# Patient Record
Sex: MALE | Race: ASIAN | NOT HISPANIC OR LATINO
[De-identification: names, ages, dates, MRNs, and addresses within clinical notes are randomized per-mention and may not be internally consistent; named-entity substitution may affect disease eponyms.]

---

## 2019-01-03 ENCOUNTER — RX RENEWAL (OUTPATIENT)
Age: 64
End: 2019-01-03

## 2020-02-06 ENCOUNTER — RX RENEWAL (OUTPATIENT)
Age: 65
End: 2020-02-06

## 2021-09-16 ENCOUNTER — APPOINTMENT (OUTPATIENT)
Age: 66
End: 2021-09-16
Payer: COMMERCIAL

## 2021-09-16 ENCOUNTER — NON-APPOINTMENT (OUTPATIENT)
Age: 66
End: 2021-09-16

## 2021-09-16 ENCOUNTER — TRANSCRIPTION ENCOUNTER (OUTPATIENT)
Age: 66
End: 2021-09-16

## 2021-09-16 ENCOUNTER — LABORATORY RESULT (OUTPATIENT)
Age: 66
End: 2021-09-16

## 2021-09-16 VITALS
BODY MASS INDEX: 26.98 KG/M2 | WEIGHT: 178 LBS | SYSTOLIC BLOOD PRESSURE: 160 MMHG | TEMPERATURE: 97.6 F | DIASTOLIC BLOOD PRESSURE: 80 MMHG | OXYGEN SATURATION: 99 % | HEART RATE: 98 BPM | HEIGHT: 68 IN

## 2021-09-16 DIAGNOSIS — I10 ESSENTIAL (PRIMARY) HYPERTENSION: ICD-10-CM

## 2021-09-16 DIAGNOSIS — Z82.0 FAMILY HISTORY OF EPILEPSY AND OTHER DISEASES OF THE NERVOUS SYSTEM: ICD-10-CM

## 2021-09-16 DIAGNOSIS — Z82.61 FAMILY HISTORY OF ARTHRITIS: ICD-10-CM

## 2021-09-16 DIAGNOSIS — Z00.00 ENCOUNTER FOR GENERAL ADULT MEDICAL EXAMINATION W/OUT ABNORMAL FINDINGS: ICD-10-CM

## 2021-09-16 DIAGNOSIS — Z80.3 FAMILY HISTORY OF MALIGNANT NEOPLASM OF BREAST: ICD-10-CM

## 2021-09-16 PROCEDURE — 99203 OFFICE O/P NEW LOW 30 MIN: CPT | Mod: 25

## 2021-09-16 PROCEDURE — 36415 COLL VENOUS BLD VENIPUNCTURE: CPT

## 2021-09-16 RX ORDER — LISINOPRIL 20 MG/1
20 TABLET ORAL
Qty: 30 | Refills: 2 | Status: DISCONTINUED | COMMUNITY
Start: 2019-01-03 | End: 2021-09-16

## 2021-09-16 RX ORDER — GLIPIZIDE 5 MG/1
5 TABLET, FILM COATED, EXTENDED RELEASE ORAL
Refills: 0 | Status: ACTIVE | COMMUNITY

## 2021-09-16 RX ORDER — GLIPIZIDE 10 MG/1
10 TABLET ORAL
Refills: 0 | Status: ACTIVE | COMMUNITY

## 2021-09-16 RX ORDER — AMLODIPINE BESYLATE 5 MG/1
5 TABLET ORAL
Refills: 0 | Status: ACTIVE | COMMUNITY

## 2021-09-16 RX ORDER — METOCLOPRAMIDE 5 MG/1
5 TABLET ORAL 4 TIMES DAILY
Qty: 120 | Refills: 0 | Status: ACTIVE | COMMUNITY
Start: 2021-09-16 | End: 1900-01-01

## 2021-09-16 RX ORDER — LISINOPRIL 5 MG/1
5 TABLET ORAL
Refills: 0 | Status: ACTIVE | COMMUNITY

## 2021-09-16 NOTE — HISTORY OF PRESENT ILLNESS
[de-identified] : 65 yr old M with a history of HTN, DM type 2, HTN, presents today for establish care visit and evaluation \par In spring was feeling some episodes of nausea\par notes feeling better after eating at that time \par a couple episodes of dark stool \par never had issues \par intermittent nausea since \par 1 month ago had bloating with meals \par particularly with dinner and had surprise amount of vomit if not monitoring quantity \par noted almost all liquid \par 1 week later another vomiting episode where he noted corn that was old food \par reduced amount of food with meals which has improved his symptoms \par noting that 3months ago and he was noting 180lbs and now noting 174 lbs at that time \par notes constant belching with rare flatus\par some back ache \par walking seems ot help his symptoms\par sometimes LUQ but mostly generalized abdominal pain \par no thin stools somewhat soft with normal color \par Has been taking antiacid happened recently with all symptoms \par some relief- famotidine \par colonoscopy never done

## 2021-09-16 NOTE — HEALTH RISK ASSESSMENT
[] : Yes [20 or more] : 20 or more [No] : In the past 12 months have you used drugs other than those required for medical reasons? No [de-identified] : recovery stopped 36 yrs ago

## 2021-09-16 NOTE — PHYSICAL EXAM
[Normal] : soft, non-tender, non-distended, no masses palpated, no HSM and normal bowel sounds [de-identified] : neg thapa sign

## 2021-09-17 LAB
ALBUMIN SERPL ELPH-MCNC: 4.9 G/DL
ALP BLD-CCNC: 84 U/L
ALT SERPL-CCNC: 20 U/L
ANION GAP SERPL CALC-SCNC: 19 MMOL/L
APPEARANCE: ABNORMAL
AST SERPL-CCNC: 16 U/L
BASOPHILS # BLD AUTO: 0.03 K/UL
BASOPHILS NFR BLD AUTO: 0.3 %
BILIRUB SERPL-MCNC: 0.2 MG/DL
BILIRUBIN URINE: NEGATIVE
BLOOD URINE: NORMAL
BUN SERPL-MCNC: 10 MG/DL
CALCIUM SERPL-MCNC: 10.3 MG/DL
CHLORIDE SERPL-SCNC: 103 MMOL/L
CHOLEST SERPL-MCNC: 198 MG/DL
CO2 SERPL-SCNC: 23 MMOL/L
COLOR: YELLOW
CREAT SERPL-MCNC: 0.98 MG/DL
CREAT SPEC-SCNC: 271 MG/DL
EOSINOPHIL # BLD AUTO: 0.32 K/UL
EOSINOPHIL NFR BLD AUTO: 3.7 %
ESTIMATED AVERAGE GLUCOSE: 148 MG/DL
GLUCOSE QUALITATIVE U: NEGATIVE
GLUCOSE SERPL-MCNC: 95 MG/DL
HBA1C MFR BLD HPLC: 6.8 %
HCT VFR BLD CALC: 46.7 %
HDLC SERPL-MCNC: 46 MG/DL
HGB BLD-MCNC: 15.5 G/DL
IMM GRANULOCYTES NFR BLD AUTO: 0.2 %
KETONES URINE: NEGATIVE
LDLC SERPL CALC-MCNC: 125 MG/DL
LEUKOCYTE ESTERASE URINE: NEGATIVE
LPL SERPL-CCNC: 27 U/L
LYMPHOCYTES # BLD AUTO: 1.9 K/UL
LYMPHOCYTES NFR BLD AUTO: 21.7 %
MAN DIFF?: NORMAL
MCHC RBC-ENTMCNC: 31.1 PG
MCHC RBC-ENTMCNC: 33.2 GM/DL
MCV RBC AUTO: 93.8 FL
MICROALBUMIN 24H UR DL<=1MG/L-MCNC: 20.2 MG/DL
MICROALBUMIN/CREAT 24H UR-RTO: 74 MG/G
MONOCYTES # BLD AUTO: 0.25 K/UL
MONOCYTES NFR BLD AUTO: 2.9 %
NEUTROPHILS # BLD AUTO: 6.22 K/UL
NEUTROPHILS NFR BLD AUTO: 71.2 %
NITRITE URINE: NEGATIVE
NONHDLC SERPL-MCNC: 152 MG/DL
PH URINE: 5.5
PLATELET # BLD AUTO: 321 K/UL
POTASSIUM SERPL-SCNC: 4 MMOL/L
PROT SERPL-MCNC: 7.5 G/DL
PROTEIN URINE: ABNORMAL
RBC # BLD: 4.98 M/UL
RBC # FLD: 13.8 %
SODIUM SERPL-SCNC: 144 MMOL/L
SPECIFIC GRAVITY URINE: 1.03
TRIGL SERPL-MCNC: 134 MG/DL
UROBILINOGEN URINE: NORMAL
WBC # FLD AUTO: 8.74 K/UL

## 2021-09-22 ENCOUNTER — APPOINTMENT (OUTPATIENT)
Dept: GASTROENTEROLOGY | Facility: CLINIC | Age: 66
End: 2021-09-22
Payer: COMMERCIAL

## 2021-09-22 DIAGNOSIS — R11.2 NAUSEA WITH VOMITING, UNSPECIFIED: ICD-10-CM

## 2021-09-22 DIAGNOSIS — E11.9 TYPE 2 DIABETES MELLITUS W/OUT COMPLICATIONS: ICD-10-CM

## 2021-09-22 DIAGNOSIS — R63.4 ABNORMAL WEIGHT LOSS: ICD-10-CM

## 2021-09-22 DIAGNOSIS — R10.84 GENERALIZED ABDOMINAL PAIN: ICD-10-CM

## 2021-09-22 PROCEDURE — 99202 OFFICE O/P NEW SF 15 MIN: CPT | Mod: 95

## 2021-09-22 NOTE — CONSULT LETTER
[Dear  ___] : Dear  [unfilled], [( Thank you for referring [unfilled] for consultation for _____ )] : Thank you for referring [unfilled] for consultation for [unfilled] [Please see my note below.] : Please see my note below. [FreeTextEntry2] : DR JUAN SOSA [Consult Closing:] : Thank you very much for allowing me to participate in the care of this patient.  If you have any questions, please do not hesitate to contact me.

## 2021-09-22 NOTE — ASSESSMENT
[FreeTextEntry1] : 1..  recent onset of nausea and vomiting, bloating, concern about eating because of discomfort rather than true anorexia\par 2.  began rather abruptly\par 3.  he is diabetic;  i always am somewhat concerned about pancreatic neoplasm\par 4.  diabetic gastroparesis is also in differential\par \par 5.  has not had colonoscopy\par \par 6.  also, could this be something infectious;  symptoms are compatible with giardiasis, enough that if basic testing is negative, I would consider a therapeutic trial with flagyl 250 mg tid for a week\par \par plan\par 1.  begin with ultrasound abdomen\par all basic labs normal, it should be pointed out\par 2.  then, colonoscopy and egd\par 3.  if all negative, ct abdomen\par 4.  if ct is negative, then therapeutic trial with flagyl\par \par More than 50% of the face to face time was devoted to counseling and /or coordination of care.  THis coordination of care may have included reviewing other medical notes and reports, and communicating with other health professionals\par

## 2021-09-22 NOTE — HISTORY OF PRESENT ILLNESS
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 [place], and I am at my office in [facility]\par \par THIS IS A GI CONSULT, REFERRED BY DR JUAN SOSA,,\par \par consent on file\par \par Hx;  six week history of nausea, abdominal bloating, occas vomiting incl poorly digested food, corn for ex, from day before\par stools softer and smaller volume than usual\par not diearrheal\par no bleeding\par has not had a colonoscopy\par vague generalized abdominal discomfort; no sharp pain\par \par Dr Virk has had type two diabetes, for the last five or so years;\par he is on glipizide\par hg A1C elevated recently to 6.7\par \par no insulin\par \par also, \par Lisinopril\par Amlodipine\par and as of last week, ReglanWeight has dropped during this timefrom 189 to 175 pounds\par \par no melena\par \par fh of breast cancer, no fh of gi cancer

## 2021-09-27 ENCOUNTER — RESULT REVIEW (OUTPATIENT)
Age: 66
End: 2021-09-27

## 2021-09-28 ENCOUNTER — RESULT REVIEW (OUTPATIENT)
Age: 66
End: 2021-09-28

## 2021-09-29 ENCOUNTER — RESULT REVIEW (OUTPATIENT)
Age: 66
End: 2021-09-29

## 2021-09-30 ENCOUNTER — RESULT REVIEW (OUTPATIENT)
Age: 66
End: 2021-09-30

## 2021-09-30 ENCOUNTER — APPOINTMENT (OUTPATIENT)
Dept: GASTROENTEROLOGY | Facility: HOSPITAL | Age: 66
End: 2021-09-30

## 2021-09-30 DIAGNOSIS — K31.89 OTHER DISEASES OF STOMACH AND DUODENUM: ICD-10-CM

## 2021-10-01 ENCOUNTER — EMERGENCY (EMERGENCY)
Facility: HOSPITAL | Age: 66
LOS: 1 days | Discharge: HOME | End: 2021-10-01
Attending: EMERGENCY MEDICINE | Admitting: EMERGENCY MEDICINE
Payer: COMMERCIAL

## 2021-10-01 VITALS
RESPIRATION RATE: 16 BRPM | HEIGHT: 68 IN | OXYGEN SATURATION: 99 % | HEART RATE: 124 BPM | WEIGHT: 167.99 LBS | TEMPERATURE: 98 F | SYSTOLIC BLOOD PRESSURE: 108 MMHG | DIASTOLIC BLOOD PRESSURE: 68 MMHG

## 2021-10-01 DIAGNOSIS — E11.9 TYPE 2 DIABETES MELLITUS WITHOUT COMPLICATIONS: ICD-10-CM

## 2021-10-01 DIAGNOSIS — R10.13 EPIGASTRIC PAIN: ICD-10-CM

## 2021-10-01 DIAGNOSIS — Z85.020 PERSONAL HISTORY OF MALIGNANT CARCINOID TUMOR OF STOMACH: ICD-10-CM

## 2021-10-01 DIAGNOSIS — K31.89 OTHER DISEASES OF STOMACH AND DUODENUM: ICD-10-CM

## 2021-10-01 DIAGNOSIS — Z87.891 PERSONAL HISTORY OF NICOTINE DEPENDENCE: ICD-10-CM

## 2021-10-01 DIAGNOSIS — I10 ESSENTIAL (PRIMARY) HYPERTENSION: ICD-10-CM

## 2021-10-01 LAB
ALBUMIN SERPL ELPH-MCNC: 4.8 G/DL — SIGNIFICANT CHANGE UP (ref 3.3–5)
ALP SERPL-CCNC: 82 U/L — SIGNIFICANT CHANGE UP (ref 40–120)
ALT FLD-CCNC: 17 U/L — SIGNIFICANT CHANGE UP (ref 10–45)
ANION GAP SERPL CALC-SCNC: 14 MMOL/L — SIGNIFICANT CHANGE UP (ref 5–17)
APTT BLD: 40.5 SEC — HIGH (ref 27.5–35.5)
AST SERPL-CCNC: 15 U/L — SIGNIFICANT CHANGE UP (ref 10–40)
BASOPHILS # BLD AUTO: 0.03 K/UL — SIGNIFICANT CHANGE UP (ref 0–0.2)
BASOPHILS NFR BLD AUTO: 0.3 % — SIGNIFICANT CHANGE UP (ref 0–2)
BILIRUB SERPL-MCNC: 0.4 MG/DL — SIGNIFICANT CHANGE UP (ref 0.2–1.2)
BLD GP AB SCN SERPL QL: NEGATIVE — SIGNIFICANT CHANGE UP
BUN SERPL-MCNC: 16 MG/DL — SIGNIFICANT CHANGE UP (ref 7–23)
CALCIUM SERPL-MCNC: 10.9 MG/DL — HIGH (ref 8.4–10.5)
CHLORIDE SERPL-SCNC: 93 MMOL/L — LOW (ref 96–108)
CO2 SERPL-SCNC: 34 MMOL/L — HIGH (ref 22–31)
CREAT SERPL-MCNC: 1.37 MG/DL — HIGH (ref 0.5–1.3)
EOSINOPHIL # BLD AUTO: 0.18 K/UL — SIGNIFICANT CHANGE UP (ref 0–0.5)
EOSINOPHIL NFR BLD AUTO: 1.8 % — SIGNIFICANT CHANGE UP (ref 0–6)
GLUCOSE SERPL-MCNC: 196 MG/DL — HIGH (ref 70–99)
HCT VFR BLD CALC: 45.6 % — SIGNIFICANT CHANGE UP (ref 39–50)
HGB BLD-MCNC: 15.8 G/DL — SIGNIFICANT CHANGE UP (ref 13–17)
IMM GRANULOCYTES NFR BLD AUTO: 0.3 % — SIGNIFICANT CHANGE UP (ref 0–1.5)
INR BLD: 1.17 — HIGH (ref 0.88–1.16)
LYMPHOCYTES # BLD AUTO: 1.58 K/UL — SIGNIFICANT CHANGE UP (ref 1–3.3)
LYMPHOCYTES # BLD AUTO: 15.8 % — SIGNIFICANT CHANGE UP (ref 13–44)
MCHC RBC-ENTMCNC: 30.9 PG — SIGNIFICANT CHANGE UP (ref 27–34)
MCHC RBC-ENTMCNC: 34.6 GM/DL — SIGNIFICANT CHANGE UP (ref 32–36)
MCV RBC AUTO: 89.1 FL — SIGNIFICANT CHANGE UP (ref 80–100)
MONOCYTES # BLD AUTO: 0.73 K/UL — SIGNIFICANT CHANGE UP (ref 0–0.9)
MONOCYTES NFR BLD AUTO: 7.3 % — SIGNIFICANT CHANGE UP (ref 2–14)
NEUTROPHILS # BLD AUTO: 7.45 K/UL — HIGH (ref 1.8–7.4)
NEUTROPHILS NFR BLD AUTO: 74.5 % — SIGNIFICANT CHANGE UP (ref 43–77)
NRBC # BLD: 0 /100 WBCS — SIGNIFICANT CHANGE UP (ref 0–0)
PLATELET # BLD AUTO: 356 K/UL — SIGNIFICANT CHANGE UP (ref 150–400)
POTASSIUM SERPL-MCNC: 3.6 MMOL/L — SIGNIFICANT CHANGE UP (ref 3.5–5.3)
POTASSIUM SERPL-SCNC: 3.6 MMOL/L — SIGNIFICANT CHANGE UP (ref 3.5–5.3)
PROT SERPL-MCNC: 8.5 G/DL — HIGH (ref 6–8.3)
PROTHROM AB SERPL-ACNC: 13.9 SEC — HIGH (ref 10.6–13.6)
RBC # BLD: 5.12 M/UL — SIGNIFICANT CHANGE UP (ref 4.2–5.8)
RBC # FLD: 12.9 % — SIGNIFICANT CHANGE UP (ref 10.3–14.5)
RH IG SCN BLD-IMP: POSITIVE — SIGNIFICANT CHANGE UP
SODIUM SERPL-SCNC: 141 MMOL/L — SIGNIFICANT CHANGE UP (ref 135–145)
WBC # BLD: 10 K/UL — SIGNIFICANT CHANGE UP (ref 3.8–10.5)
WBC # FLD AUTO: 10 K/UL — SIGNIFICANT CHANGE UP (ref 3.8–10.5)

## 2021-10-01 PROCEDURE — 85730 THROMBOPLASTIN TIME PARTIAL: CPT

## 2021-10-01 PROCEDURE — 96374 THER/PROPH/DIAG INJ IV PUSH: CPT

## 2021-10-01 PROCEDURE — 71046 X-RAY EXAM CHEST 2 VIEWS: CPT

## 2021-10-01 PROCEDURE — 99284 EMERGENCY DEPT VISIT MOD MDM: CPT | Mod: 25

## 2021-10-01 PROCEDURE — 86900 BLOOD TYPING SEROLOGIC ABO: CPT

## 2021-10-01 PROCEDURE — 86850 RBC ANTIBODY SCREEN: CPT

## 2021-10-01 PROCEDURE — 71046 X-RAY EXAM CHEST 2 VIEWS: CPT | Mod: 26

## 2021-10-01 PROCEDURE — 86901 BLOOD TYPING SEROLOGIC RH(D): CPT

## 2021-10-01 PROCEDURE — 80053 COMPREHEN METABOLIC PANEL: CPT

## 2021-10-01 PROCEDURE — 36415 COLL VENOUS BLD VENIPUNCTURE: CPT

## 2021-10-01 PROCEDURE — 85610 PROTHROMBIN TIME: CPT

## 2021-10-01 PROCEDURE — 99285 EMERGENCY DEPT VISIT HI MDM: CPT | Mod: 25

## 2021-10-01 PROCEDURE — 93010 ELECTROCARDIOGRAM REPORT: CPT

## 2021-10-01 PROCEDURE — 85025 COMPLETE CBC W/AUTO DIFF WBC: CPT

## 2021-10-01 RX ORDER — SODIUM CHLORIDE 9 MG/ML
1000 INJECTION INTRAMUSCULAR; INTRAVENOUS; SUBCUTANEOUS ONCE
Refills: 0 | Status: COMPLETED | OUTPATIENT
Start: 2021-10-01 | End: 2021-10-01

## 2021-10-01 RX ORDER — MORPHINE SULFATE 50 MG/1
4 CAPSULE, EXTENDED RELEASE ORAL ONCE
Refills: 0 | Status: DISCONTINUED | OUTPATIENT
Start: 2021-10-01 | End: 2021-10-01

## 2021-10-01 RX ORDER — FAMOTIDINE 10 MG/ML
20 INJECTION INTRAVENOUS ONCE
Refills: 0 | Status: COMPLETED | OUTPATIENT
Start: 2021-10-01 | End: 2021-10-01

## 2021-10-01 RX ADMIN — SODIUM CHLORIDE 1000 MILLILITER(S): 9 INJECTION INTRAMUSCULAR; INTRAVENOUS; SUBCUTANEOUS at 20:04

## 2021-10-01 RX ADMIN — FAMOTIDINE 20 MILLIGRAM(S): 10 INJECTION INTRAVENOUS at 20:00

## 2021-10-01 NOTE — ED PROVIDER NOTE - PATIENT PORTAL LINK FT
You can access the FollowMyHealth Patient Portal offered by [facility] by registering at the following website: [email or web address]. By joining [facility] FollowMyHealth portal, you will also be able to view your health information using other applications (apps) compatible with our system.

## 2021-10-01 NOTE — ED PROVIDER NOTE - NSFOLLOWUPINSTRUCTIONS_ED_ALL_ED_FT
AS DISCUSSED, PLEASE CALL DR LOPEZ AS SOON AS POSSIBLE. PLEASE GATHER ALL YOUR RESULTS AND RECORDS TO BRING TO YOUR APPOINTMENT.    PLEASE RETURN TO THE EMERGENCY DEPARTMENT IF FEVER, CHEST PAIN, SHORTNESS OF BREATH, ABDOMINAL PAIN, VOMITING, OTHER CONCERNING SYMPTOMS.     PLEASE CONTACT MELVIN CAMPOVERDE ([facility] EMERGENCY DEPARTMENT CLINICAL REFERRAL COORDINATOR) TO ASSIST IN SCHEDULING YOUR FOLLOW-UP APPOINTMENT.    Monday - Friday 11am-7pm  [phone]  [email or web address]

## 2021-10-01 NOTE — ED ADULT TRIAGE NOTE - CHIEF COMPLAINT QUOTE
pt c/o abdominal discomfort, vomiting, and weight loss for awhile. pt found out yesterday he has abdominal cancer, sent for GI, oncology consult.

## 2021-10-01 NOTE — ED ADULT NURSE REASSESSMENT NOTE - NS ED NURSE REASSESS COMMENT FT1
pt aaox3 no deficits no sob no chest pain no n/v.  iv intact and infusing.   states abd pain is manageable.  pending results and dispo.

## 2021-10-01 NOTE — ED PROVIDER NOTE - OBJECTIVE STATEMENT
66M former smoker (30py), htn, niddm, completed covid19 vaccination (>4w ago), recently dx gastric antrum carcinoma by ct/bx yesterday, referred from [facility] by dr rehana gillis, GI, for evaluation by dr rush, surgical oncology. pt underwent bowel prep yesterday w/o po intake yet today. +chronic epigastric burning pain/reflux. no fever/chills, no uri/cough, no cp/sob, no n/v, no diarrhea, no hematochezia/melena, no dysuria, no rash, no prior covid, no trauma, no etoh-dpt/ivdu.

## 2021-10-01 NOTE — ED ADULT NURSE NOTE - CAS DISCH CONDITION
Certification for Inpatient


Patient admitted to: Inpatient


With expected LOS: >2 Midnights


Practitioner: I am a practitioner with admitting privileges, knowledge of 

patient current condition, hospital course, and medical plan of care.


Services: Services provided to patient in accordance with Admission 

requirements found in Title 42 Section 412.3 of the Code of Federal Regulations





Patient History


Date of Service: 03/23/18


Reason for admission: worsening left chronic osteomyelitis/wound


History of Present Illness: 





Mr Dale is a 33 years old male with history of chronic wound/osteomyelitis in 

his left foot, came to ED complaining of increasing pain, fever, sweating 

episodes since 2 days ago. He knows that his wound is worse because can smell 

foul odor since a couple of days. He is followed up by Dr Dorado in the wound 

healing center, last time seen about 1 week ago, according to the patient. At 

arrival he was febrile 100.0 F, WBC within normal limits, normal lactate and 

procalcitonin. Bilirubin is elevated. Left foot wound has necrotic margins with 

foul odor and yellowish secretion.


Allergies





levofloxacin [From Levaquin] Allergy (Intermediate, Verified 11/02/17 20:23)


 Hives


morphine Allergy (Intermediate, Verified 11/02/17 20:23)


 Hives


sulfamethoxazole [From Bactrim] Allergy (Intermediate, Verified 11/02/17 20:23)


 Hives


trimethoprim [From Bactrim] Allergy (Intermediate, Verified 11/02/17 20:23)


 Hives


ketorolac Allergy (Verified 11/02/17 20:23)


 Unknown


ketorolac tromethamine [From Toradol] Allergy (Verified 03/25/17 00:13)


 Nausea/Vomiting


ondansetron [From Zofran (as hydrochloride)] Allergy (Unverified 04/20/17 22:09)


 Unknown


ondansetron HCl [From Zofran (as hydrochloride)] Allergy (Verified 11/02/17 20:

23)


 Nausea/Vomiting


vancomycin Allergy (Verified 11/02/17 20:23)


 Hives


amoxicillin [From Augmentin] Adverse Reaction (Verified 03/25/17 00:13)


 Nausea/Vomiting


ciprofloxacin Adverse Reaction (Verified 03/25/17 00:13)


 Nausea/Vomiting


clavulanic acid [From Augmentin] Adverse Reaction (Verified 03/25/17 00:13)


 Nausea/Vomiting


doxycycline Adverse Reaction (Verified 03/25/17 00:13)


 Nausea/Vomiting


CLAVULANIC Allergy (Uncoded 04/26/17 23:21)


 Unknown


Doxycyclin Allergy (Uncoded 04/20/17 22:09)


 Unknown


Doxycycline Allergy (Uncoded 04/20/17 11:56)


 Unknown


TRIME Allergy (Uncoded 04/20/17 22:09)


 Unknown


TRIMET Allergy (Uncoded 04/26/17 23:21)


 Unknown


VANCOMYCIN AN Allergy (Uncoded 04/20/17 11:56)


 Unknown


Zofran (as h Allergy (Uncoded 04/20/17 11:56)


 Unknown


Zofran (as hyd Allergy (Uncoded 04/26/17 23:21)


 Unknown





Home Medications: 








Oxycodone HCl/Acetaminophen [Oxycodone-Acetaminophen ] 1 tab PO TID 07/21/ 16 


Collagenase [Santyl Ointment*] 1 appl TOP DAILY #30 tube 07/25/16 


Clindamycin HCl [Cleocin HCl] 300 mg PO TID #30 capsule 11/03/17 


Collagenase [Santyl Ointment*] 1 appl TOP DAILY #1 tube 11/03/17 


Famotidine [Pepcid*] 20 mg PO BID #60 tab 11/03/17 


traMADol HCL [Ultram*] 50 mg PO TID PRN #20 tab 11/03/17 








- Past Medical/Surgical History


Diabetic: No


-: Spina bifida


-: History of osteomyelitis


-: History of recurrent UTIs with sepsis


-: Hydrocephalus


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


-: Migraines


-: Chronic indwelling suprapubic catheter


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


-: Shunt revision


-: Cholecystectomy


-: Foot surgery


-: Hip sx BL


-: Bilateral hip surgery


-: Appendectomy


Psychosocial/ Personal History: Patient currently single.  He has no children.  

He is disabled.





- Family History


  ** Father


-: Hypertension





  ** Mother


-: Hypertension





- Social History


Smoking Status: Current every day smoker


Counseled patient to stop smoking for: less than 10 minutes


Alcohol use: Yes


CD- Drugs: No


Caffeine use: Yes





Review of Systems


10-point ROS is otherwise unremarkable





Physical Examination





- Physical Exam


General: Alert, In no apparent distress


HEENT: Atraumatic, PERRLA, Mucous membr. moist/pink, EOMI, Sclerae nonicteric


Neck: Supple, 2+ carotid pulse no bruit, No LAD, Without JVD or thyroid 

abnormality


Respiratory: Clear to auscultation bilaterally, Normal air movement


Cardiovascular: Regular rate/rhythm, Normal S1 S2


Gastrointestinal: Normal bowel sounds, No tenderness


Musculoskeletal: Swelling


Integumentary: No rashes, Skin breakdown, Skin lesion, Other (left foot 

ulcerative wound with necrotic marigins and yellowish secretions)


Neurological: Normal speech, Normal tone, Normal affect


Lymphatics: No axilla or inguinal lymphadenopathy





- Studies


Laboratory Data (last 24 hrs)





03/22/18 21:49: PT 13.0 H, INR 1.10


03/22/18 21:49: WBC 8.5, Hgb 15.2, Hct 45.4, Plt Count 353


03/22/18 21:49: B-Natriuretic Peptide 20


03/22/18 21:49: Sodium 141, Potassium 3.9, BUN 10, Creatinine 0.80, Glucose 84, 

Total Bilirubin 1.9 H, AST 88 H,  H, Alkaline Phosphatase 192 H, Lipase 

22








Assessment and Plan





- Problems (Diagnosis)


(1) Pressure ulcer of right ankle, stage 3


Current Visit: No   Status: Acute   





(2) Osteomyelitis


Current Visit: No   Status: Chronic   


Qualifiers: 


   Osteomyelitis type: chronic multifocal 





(3) Spina bifida


Onset Date: 11/03/17   Current Visit: No   Status: Chronic   


Qualifiers: 


   Spinal region: lumbosacral   Presence of hydrocephalus: with hydrocephalus   

Qualified Code(s): Q05.2 - Lumbar spina bifida with hydrocephalus   





(4) Nausea & vomiting


Onset Date: 07/22/16   Current Visit: No   Status: Resolved   


Qualifiers: 


   Vomiting type: unspecified   Vomiting Intractability: unspecified   

Qualified Code(s): R11.2 - Nausea with vomiting, unspecified   





- Plan





The patient will be admitted to the hospital due to necrotic pressure ulcer on 

his left foot, known having chronic osteomyelitis. Will start empiric 

antibiotics, adjusted to his extensive list of medication allergies, consult Dr Dorado, order symptomatic medication for nausea and pain.





- Advance Directives


Does patient have a Living Will: No


Does patient have a Durable POA for Healthcare: Yes





- Code Status/Comfort Care


Code Status Assessed: Yes


Code Status: Full Code Stable

## 2021-10-01 NOTE — ED PROVIDER NOTE - CARE PROVIDER_API CALL
Chilo Barfield  Surgical Oncology  [address] [address]  Phone: [phone]  Fax: [phone]  Follow Up Time: Urgent

## 2021-10-01 NOTE — ED PROVIDER NOTE - CLINICAL SUMMARY MEDICAL DECISION MAKING FREE TEXT BOX
avss. nontoxic. NAD. recently dx gastric antrum cancer. referred to [facility] for evaluation by dr rush. surgery consulted. preop labs/ekg/cxr done while awaiting reccs. surgery unable to get a hold of dr rush. pt w/ full capacity stating he did not anticipate admission or surgery and so would like to be discharged home. requesting to be discharged. surgery aware. will dc w/ outpatient surgery onc fu. strict return precautions. pt agrees w/ plan. questions answered.

## 2021-10-01 NOTE — ED PROVIDER NOTE - PROGRESS NOTE DETAILS
multiple calls to surgery. still awaiting final dispo. contacted surgery chief, juliet, regarding delay in dispo. will reattempt contacting on call surgeons and call back with dispo. per surgery chief, on call attending requesting pt be admitted. however, after lengthy d/w pt, pt requesting to leave. pt w/ full capacity. pt states he would prefer to do any further metastatic workup as an outpatient. risks/benefits discussed. questions answered. surgery chief updated.

## 2021-10-02 VITALS
HEART RATE: 99 BPM | OXYGEN SATURATION: 99 % | RESPIRATION RATE: 18 BRPM | DIASTOLIC BLOOD PRESSURE: 62 MMHG | SYSTOLIC BLOOD PRESSURE: 146 MMHG

## 2021-10-02 NOTE — CHART NOTE - NSCHARTNOTEFT_GEN_A_CORE
66M HTN, DM, former smoker transferred from [place] for newly diagnosed gastric antrum ca on EGD according to patient, no EGD report available. Pt had been ? months of epigastric abd pain, fullness, poor PO intake with intermittent bilious vomiting. CT at [facility] today showed 4.5cm D3 vs head of pancreas mass with possible evidence of obstruction. In our ED  initially, improved to 99, /68. Abd SNT. WCC 10, metabolic alkalosis with low chloride, MAGDA Cr 1.37 consistent with dehydration from chronic upper GI losses. Pt refused admission and opted to leave and follow up with Carolyne in office. Discussed risks of leaving without medical treatment including worsening dehydration, magda, obstruction, pt insisted leaving against our advice but agreed for outpatient follow up. Attending Dr. Muñiz aware.

## 2021-10-02 NOTE — PROGRESS NOTE ADULT - SUBJECTIVE AND OBJECTIVE BOX
66M former smoker (30py), htn, niddm, completed covid19 vaccination (>4w ago), recently dx gastric antrum carcinoma by ct/bx yesterday, referred from [facility] by Dr herberth gillis, GI, for evaluation by Dr rush, surgical oncology. The patient indicated that he has been having epigastric fullness, decrease PO tolerance, weight loss (at least 20 lb in 4 months). These symptoms prompted him to seek medical care. Today, he denies any acute symptoms including abd pain, n, v, changes to the color of his stool, constipation or diarrhea. The patient is active smoker and has been on and off since the age of 21. 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 We are not able to find the report of his EGD, biopsy or C scope reports (neither on all script nor in the physical papers).   Initially in the ED, he was tachycardic, likely due to dehydration after going through the colon prep for his colonoscopy on 10/1. He received one liter of fluid in the ED.     PMH: T2D (on glipizide 10 mg once a day), HTN (controlled w/out meds)    Social Hx: denies illicit drug use, smoker, former alcoholic, see above     MEDICATIONS:  Glipizide 10mg     Allergies  No Known Allergies      SOCIAL HISTORY:  none    FAMILY HISTORY:      Vital Signs Last 24 Hrs  T(C): 37.1 (01 Oct 2021 22:31), Max: 37.1 (01 Oct 2021 22:31)  T(F): 98.7 (01 Oct 2021 22:31), Max: 98.7 (01 Oct 2021 22:31)  HR: 99 (02 Oct 2021 00:31) (99 - 124)  BP: 146/62 (02 Oct 2021 00:31) (108/68 - 150/65)  BP(mean): --  RR: 18 (02 Oct 2021 00:31) (16 - 18)  SpO2: 99% (02 Oct 2021 00:31) (99% - 99%)    PHYSICAL EXAM:  NAD, pleasant, great historian  nonlabored breathing  NSR  Abd: soft, nontender, epigastric fullness, no evidence of surgical scars      LABS:                        15.8   10.00 )-----------( 356      ( 01 Oct 2021 19:07 )             45.6     10-01    141  |  93<L>  |  16  ----------------------------<  196<H>  3.6   |  34<H>  |  1.37<H>    Ca    10.9<H>      01 Oct 2021 19:07    TPro  8.5<H>  /  Alb  4.8  /  TBili  0.4  /  DBili  x   /  AST  15  /  ALT  17  /  AlkPhos  82  10-01    PT/INR - ( 01 Oct 2021 19:07 )   PT: 13.9 sec;   INR: 1.17          PTT - ( 01 Oct 2021 19:07 )  PTT:40.5 sec      RADIOLOGY & ADDITIONAL STUDIES:              Plan: pending discussion with attending

## 2021-10-02 NOTE — PROGRESS NOTE ADULT - ASSESSMENT
66M former smoker (30py), htn, niddm, completed covid19 vaccination (>4w ago), recently dx gastric antrum carcinoma by ct/bx yesterday, referred from [facility] by Dr herberth gillis, GI, for evaluation by Dr. Barfield, surgical oncology. The patient indicated that he has been having epigastric fullness, decrease PO tolerance, weight loss (at least 20 lb in 4 months); no EGD report wiht patient's document. Surgery consulted for Surgical evaluation.     plan:   Patient was adamant about seeing Dr. Barfield. while talking to patient in the Ed about his EGD report, patient stated he wanted to leave. He was upset he didn't get a chance to see Dr. Barfield. Patient was offered to admission so hje can be worked up Dr. Barfield but he Refused and wanted to leave against our advice. Patient advised to return to the ED if he experiences pain, n/v; He was instructed to follow up with Dr. Barfield clinic.

## 2021-10-07 ENCOUNTER — NON-APPOINTMENT (OUTPATIENT)
Age: 66
End: 2021-10-07

## 2021-10-11 ENCOUNTER — NON-APPOINTMENT (OUTPATIENT)
Age: 66
End: 2021-10-11

## 2021-10-13 ENCOUNTER — NON-APPOINTMENT (OUTPATIENT)
Age: 66
End: 2021-10-13